# Patient Record
Sex: MALE | Race: BLACK OR AFRICAN AMERICAN | NOT HISPANIC OR LATINO | URBAN - METROPOLITAN AREA
[De-identification: names, ages, dates, MRNs, and addresses within clinical notes are randomized per-mention and may not be internally consistent; named-entity substitution may affect disease eponyms.]

---

## 2023-02-21 LAB
CHLAMYDIA TRACH., AMPLIFIED: POSITIVE
N. GONORRHEA, AMPLIFIED: NEGATIVE

## 2023-02-26 PROBLEM — S39.012A LOW BACK STRAIN: Status: ACTIVE | Noted: 2023-02-26

## 2023-02-26 PROBLEM — M41.9 SCOLIOSIS: Status: ACTIVE | Noted: 2023-02-26

## 2023-02-26 PROBLEM — F32.A DEPRESSION: Status: ACTIVE | Noted: 2023-02-26

## 2023-02-26 PROBLEM — A74.9 CHLAMYDIA: Status: ACTIVE | Noted: 2023-02-26

## 2023-02-26 PROBLEM — F41.9 ANXIETY: Status: ACTIVE | Noted: 2023-02-26

## 2023-02-26 PROBLEM — D75.A G6PD DEFICIENCY: Status: ACTIVE | Noted: 2023-02-26

## 2023-02-26 PROBLEM — F60.3 BORDERLINE PERSONALITY DISORDER (MULTI): Status: ACTIVE | Noted: 2023-02-26

## 2023-02-26 PROBLEM — R45.851 SUICIDAL THOUGHTS: Status: ACTIVE | Noted: 2023-02-26

## 2023-02-26 PROBLEM — R00.2 HEART PALPITATIONS: Status: ACTIVE | Noted: 2023-02-26

## 2023-02-26 PROBLEM — L70.9 ACNE: Status: ACTIVE | Noted: 2023-02-26

## 2023-03-06 ENCOUNTER — OFFICE VISIT (OUTPATIENT)
Dept: PRIMARY CARE | Facility: CLINIC | Age: 23
End: 2023-03-06
Payer: COMMERCIAL

## 2023-03-06 VITALS — WEIGHT: 216 LBS | SYSTOLIC BLOOD PRESSURE: 119 MMHG | BODY MASS INDEX: 23.15 KG/M2 | DIASTOLIC BLOOD PRESSURE: 76 MMHG

## 2023-03-06 DIAGNOSIS — R00.2 HEART PALPITATIONS: Primary | ICD-10-CM

## 2023-03-06 DIAGNOSIS — Z12.11 SCREENING FOR COLON CANCER: ICD-10-CM

## 2023-03-06 DIAGNOSIS — S39.012S STRAIN OF LUMBAR REGION, SEQUELA: ICD-10-CM

## 2023-03-06 DIAGNOSIS — F32.89 OTHER DEPRESSION: ICD-10-CM

## 2023-03-06 DIAGNOSIS — F60.3 BORDERLINE PERSONALITY DISORDER (MULTI): ICD-10-CM

## 2023-03-06 DIAGNOSIS — E78.2 MIXED HYPERLIPIDEMIA: ICD-10-CM

## 2023-03-06 DIAGNOSIS — D57.3 SICKLE-CELL TRAIT (CMS-HCC): ICD-10-CM

## 2023-03-06 DIAGNOSIS — A64 STD (MALE): ICD-10-CM

## 2023-03-06 PROCEDURE — 86706 HEP B SURFACE ANTIBODY: CPT

## 2023-03-06 PROCEDURE — 1036F TOBACCO NON-USER: CPT | Performed by: INTERNAL MEDICINE

## 2023-03-06 PROCEDURE — 36415 COLL VENOUS BLD VENIPUNCTURE: CPT | Performed by: INTERNAL MEDICINE

## 2023-03-06 PROCEDURE — 80061 LIPID PANEL: CPT | Performed by: INTERNAL MEDICINE

## 2023-03-06 PROCEDURE — 80053 COMPREHEN METABOLIC PANEL: CPT | Performed by: INTERNAL MEDICINE

## 2023-03-06 PROCEDURE — 84443 ASSAY THYROID STIM HORMONE: CPT | Performed by: INTERNAL MEDICINE

## 2023-03-06 PROCEDURE — 87389 HIV-1 AG W/HIV-1&-2 AB AG IA: CPT

## 2023-03-06 PROCEDURE — 86632 CHLAMYDIA IGM ANTIBODY: CPT

## 2023-03-06 PROCEDURE — 99214 OFFICE O/P EST MOD 30 MIN: CPT | Performed by: INTERNAL MEDICINE

## 2023-03-06 NOTE — PROGRESS NOTES
Subjective   Patient ID: Godwin Wyatt is a 22 y.o. male who presents for Annual Exam.    HPI   22 years old male comes in to see me for the first time in this office for an annual physical exam and to be checked for STD.  Has a history of sickle cell trait and borderline personality diagnosed in December last year.    Review of Systems  Negative review of system.    Objective   /76   Wt 98 kg (216 lb)   BMI 23.15 kg/m²     Physical Exam  Alert oriented in no acute distress very pleasant and cooperative.  Nonicteric sclera or jaundice.  Face symmetrical cranial nerves intact.  Neck supple no masses lymph no thyromegaly or jugular venous distention.  Abnormal bruit present on the right carotid at the base of his neck.  Lungs clear no rales wheezing or crackles.  Heart normal S1 and S2 regular rhythm.  Abdomen benign and neurologically intact.  Assessment/Plan     .Diagnoses and all orders for this visit:  Borderline personality disorder (CMS/HCC)  Other depression  Heart palpitations  Strain of lumbar region, sequela

## 2023-03-07 LAB
HEPATITIS B VIRUS SURFACE AB (MIU/ML) IN SERUM: <3.1 MIU/ML
HIV 1/ 2 AG/AB SCREEN: NONREACTIVE

## 2023-03-13 LAB
Lab: NORMAL

## 2023-05-24 ENCOUNTER — TELEPHONE (OUTPATIENT)
Dept: PEDIATRICS | Facility: CLINIC | Age: 23
End: 2023-05-24
Payer: COMMERCIAL

## 2023-05-25 NOTE — TELEPHONE ENCOUNTER
Called 5/24 and JOSE DAVID  Called 5/25 and LM  I see he has appt with new adult PCP Dr. Spann in June. I said in message I am happy to see him in office. Or call back.

## 2023-06-12 ENCOUNTER — APPOINTMENT (OUTPATIENT)
Dept: PRIMARY CARE | Facility: CLINIC | Age: 23
End: 2023-06-12
Payer: COMMERCIAL